# Patient Record
Sex: FEMALE | Race: WHITE | NOT HISPANIC OR LATINO | Employment: OTHER | URBAN - METROPOLITAN AREA
[De-identification: names, ages, dates, MRNs, and addresses within clinical notes are randomized per-mention and may not be internally consistent; named-entity substitution may affect disease eponyms.]

---

## 2017-01-27 ENCOUNTER — VIRTUAL VISIT (OUTPATIENT)
Dept: FAMILY MEDICINE | Facility: OTHER | Age: 57
End: 2017-01-27

## 2017-04-02 ENCOUNTER — VIRTUAL VISIT (OUTPATIENT)
Dept: FAMILY MEDICINE | Facility: OTHER | Age: 57
End: 2017-04-02

## 2017-04-02 NOTE — PROGRESS NOTES
Date:   Clinician: Alexa Mcwilliams  Clinician NPI: [id number]  Patient: Kimmy Lindsey  Patient : 1960  Patient Address: [address] [place] [address]  Patient Phone: [phone]  Visit Protocol: URI  Patient Summary:  Kimmy is a 56 year old ( : 1960 ) female who initiated a Zip for evaluation of bronchitis.      Her symptoms started suddenly 1 week ago and consist of myalgias, chest pain, malaise, loss of appetite, cough, chills, rhinitis, post-nasal drainage, nasal congestion, and hoarse voice.   She denies ear pain, fever, facial pain or pressure, itchy eyes, nausea, vomiting, dyspnea, dysphagia, and sore throat. She denies a history of facial surgery. When asked follow-up questions about her chest pain she denied feeling chest pain or pressure at rest, worsening chest pain with activity, that the chest pain is causing shortness of breath or that the chest pain is the main concern or reason for seeking medical care.   Her moderate nasal secretions are yellow. Kimmy has a moderate headache. The headache did not start before her other symptoms and is located on both sides of her head. When asked to feel her neck she denied feeling enlarged lymph nodes. She denies axillary lymphadenopathy.   Her moderately severe (cough every 5-10 minutes) productive cough is NOT more bothersome at night. She believes the cough is not caused by post-nasal drainage. Her cough produces yellow sputum.   She denies rigors.   Kimmy denies having COPD or other chronic lung disease.   Pulse: self-reported pulse rate as: 12 beats in 10 seconds.    Weight (in lbs): 140   She states she is not pregnant and denies breastfeeding. She no longer menstruates.   Kimmy does not smoke or use smokeless tobacco.   Additional information as reported by the patient (free text): I have been taking a DM cough syrup and alternating acetaminophen and ibuprofen for pain. I have had bronchitis and pneumonia a number of  times in my past. When I cough I can feel and hear the mucous rattling around in my upper chest until I get it coughed up. I don't seem to be improving at all. It started in my chest.    MEDICATIONS:  No current medications , ALLERGIES:  NKDA   Clinician Response:  Dear Kimmy,  Based on the information you have provided, I am unable to diagnose and treat you without additional information. Please be seen in a clinic or urgent care to determine the treatment that is best for you. You will not be charged for this Zip. Thanks for using [employer].   Diagnosis: Unable to diagnose  Diagnosis ICD: R69  Additional Clinician Notes: You need to be assessed in clinic where your lungs can be assessed we cannot treat you on line.

## 2017-04-06 ENCOUNTER — HOSPITAL ENCOUNTER (OUTPATIENT)
Dept: MAMMOGRAPHY | Facility: CLINIC | Age: 57
Discharge: HOME | End: 2017-04-06
Attending: FAMILY MEDICINE | Admitting: FAMILY MEDICINE
Payer: COMMERCIAL

## 2017-04-06 DIAGNOSIS — Z12.31 VISIT FOR SCREENING MAMMOGRAM: ICD-10-CM

## 2017-04-06 PROCEDURE — 77063 BREAST TOMOSYNTHESIS BI: CPT

## 2017-04-06 NOTE — PROGRESS NOTES
Kimmy,     I helping to cover some of Dr. Bruno's results since she is out of the office.     Your mammogram is normal. Plan to repeat in 1 year.    Please call or email with any additional questions or concerns  CRISTIAN Lopez CNP

## 2017-11-29 ENCOUNTER — E-VISIT (OUTPATIENT)
Dept: FAMILY MEDICINE | Facility: CLINIC | Age: 57
End: 2017-11-29

## 2017-11-29 ENCOUNTER — TELEPHONE (OUTPATIENT)
Dept: FAMILY MEDICINE | Facility: CLINIC | Age: 57
End: 2017-11-29

## 2017-11-29 DIAGNOSIS — N76.0 VAGINITIS AND VULVOVAGINITIS: Primary | ICD-10-CM

## 2017-11-29 NOTE — TELEPHONE ENCOUNTER
I called the patient and recommended she do an e visit. If the e visit is not possible she is scheduled for 3 pm tomorrow. Danna Calixto RN'

## 2017-11-29 NOTE — MR AVS SNAPSHOT
After Visit Summary   11/29/2017    Kimmy Lindsey    MRN: [id number]           Patient Information     Date Of Birth          1960        Visit Information        Provider Department      11/29/2017 11:22 AM Sangita Bruno DO [facility]        Today's Diagnoses     Vaginitis and vulvovaginitis    -  1       Follow-ups after your visit        Your next 10 appointments already scheduled     Nov 30, 2017  3:00 PM CST   SHORT with Sangita Bruno DO   [facility] ([facility])    [address] [address]   [phone]              Who to contact     Normal or non-critical lab and imaging results will be communicated to you by in[employer]Now[employer], letter or phone within 4 business days after the clinic has received the results. If you do not hear from us within 7 days, please contact the clinic through MyChart or phone. If you have a critical or abnormal lab result, we will notify you by phone as soon as possible.  Submit refill requests through [employer] or call your pharmacy and they will forward the refill request to us. Please allow 3 business days for your refill to be completed.          If you need to speak with a  for additional information , please call: [phone]           Additional Information About Your Visit        [employer] Information     [employer] gives you secure access to your electronic health record. If you see a primary care provider, you can also send messages to your care team and make appointments. If you have questions, please call your primary care clinic.  If you do not have a primary care provider, please call [phone] and they will assist you.        Care EveryWhere ID     This is your Care EveryWhere ID. This could be used by other organizations to access your [place] medical records  [id number]         Blood Pressure from Last 3 Encounters:   05/16/16 110/72   12/19/14 104/72   10/17/13  118/70    Weight from Last 3 Encounters:   05/16/16 142 lb 12.8 oz (64.8 kg)   12/19/14 138 lb (62.6 kg)   10/17/13 138 lb (62.6 kg)              Today, you had the following     No orders found for display       Primary Care Provider Office Phone # Fax #    Sangita Bruno [phone] [address] [place] [address]        Equal Access to Services     EMMA MCGHEE : Hadii aad ku hadasho Soomaali, waaxda luqadaha, qaybta kaalmada adeegyada, waxay idiin hayaan adeeg kharash la'zuleman . So [facility] [phone].    ATENCIÓN: Si habla español, tiene a bingham disposición servicios gratuitos de asistencia lingüística. Llame al [phone].    We comply with applicable federal civil rights laws and [place] laws. We do not discriminate on the basis of race, color, national origin, age, disability, sex, sexual orientation, or gender identity.            Thank you!     Thank you for choosing [facility]  for your care. Our goal is always to provide you with excellent care. Hearing back from our patients is one way we can continue to improve our services. Please take a few minutes to complete the written survey that you may receive in the mail after your visit with us. Thank you!             Your Updated Medication List - Protect others around you: Learn how to safely use, store and throw away your medicines at www.disposemymeds.org.          This list is accurate as of: 11/29/17 11:59 PM.  Always use your most recent med list.                   Brand Name Dispense Instructions for use Diagnosis    Multi-vitamin Tabs tablet   Generic drug:  multivitamin, therapeutic with minerals     90    Take one daily    Other specified prophylactic or treatment measure

## 2017-11-29 NOTE — TELEPHONE ENCOUNTER
Reason for call:  Patient reporting a symptom    Symptom or request: Yellow vaginal discharge    Duration (how long have symptoms been present): 1 week    Have you been treated for this before? No    Additional comments: Pt states after intercourse she started having discharge. Pt reports she is post menopausal and doesn't normally have this, wondering if she needs to be seen.    Phone Number patient can be reached at:  Cell number on file:    Telephone Information:   Mobile [phone]        Best Time:  any    Can we leave a detailed message on this number:  YES    Call taken on 11/29/2017 at 10:16 AM by Pratibha Egan

## 2017-11-30 ENCOUNTER — OFFICE VISIT (OUTPATIENT)
Dept: FAMILY MEDICINE | Facility: CLINIC | Age: 57
End: 2017-11-30
Payer: COMMERCIAL

## 2017-11-30 VITALS
WEIGHT: 144 LBS | BODY MASS INDEX: 23.14 KG/M2 | HEART RATE: 76 BPM | TEMPERATURE: 97.6 F | HEIGHT: 66 IN | SYSTOLIC BLOOD PRESSURE: 108 MMHG | DIASTOLIC BLOOD PRESSURE: 74 MMHG

## 2017-11-30 DIAGNOSIS — N76.0 VAGINITIS AND VULVOVAGINITIS: Primary | ICD-10-CM

## 2017-11-30 DIAGNOSIS — Z12.4 SCREENING FOR CERVICAL CANCER: ICD-10-CM

## 2017-11-30 LAB
SPECIMEN SOURCE: NORMAL
WET PREP SPEC: NORMAL

## 2017-11-30 PROCEDURE — 87210 SMEAR WET MOUNT SALINE/INK: CPT | Performed by: FAMILY MEDICINE

## 2017-11-30 PROCEDURE — G0145 SCR C/V CYTO,THINLAYER,RESCR: HCPCS | Performed by: FAMILY MEDICINE

## 2017-11-30 PROCEDURE — 87624 HPV HI-RISK TYP POOLED RSLT: CPT | Performed by: FAMILY MEDICINE

## 2017-11-30 PROCEDURE — 99213 OFFICE O/P EST LOW 20 MIN: CPT | Performed by: FAMILY MEDICINE

## 2017-11-30 NOTE — NURSING NOTE
"Initial /74  Pulse 76  Temp 97.6  F (36.4  C) (Tympanic)  Ht 5' 6\" (1.676 m)  Wt 144 lb (65.3 kg)  LMP 07/18/2011  Breastfeeding? No  BMI 23.24 kg/m2 Estimated body mass index is 23.24 kg/(m^2) as calculated from the following:    Height as of this encounter: 5' 6\" (1.676 m).    Weight as of this encounter: 144 lb (65.3 kg). .      "

## 2017-11-30 NOTE — MR AVS SNAPSHOT
After Visit Summary   11/30/2017    Kimmy Lindsey    MRN: [id number]           Patient Information     Date Of Birth          1960        Visit Information        Provider Department      11/30/2017 3:00 PM Sangita Bruno DO [facility]        Today's Diagnoses     Vaginitis and vulvovaginitis    -  1    Screening for cervical cancer          Care Instructions    The swab we did today for you today was negative for any bacterial overgrowth causing the discharge.      We'll go ahead with an ultrasound and make sure that is normal and can consider a vaginal estrogen as perhaps this is coming from some atrophy if the tissues.    You can call [phone] to schedule the ultrasound          Follow-ups after your visit        Future tests that were ordered for you today     Open Future Orders        Priority Expected Expires Ordered    US Pelvic Complete with Transvaginal Routine  11/30/2018 11/30/2017            Who to contact     Normal or non-critical lab and imaging results will be communicated to you by [employer]hart, letter or phone within 4 business days after the clinic has received the results. If you do not hear from us within 7 days, please contact the clinic through Repplerhart or phone. If you have a critical or abnormal lab result, we will notify you by phone as soon as possible.  Submit refill requests through [employer] or call your pharmacy and they will forward the refill request to us. Please allow 3 business days for your refill to be completed.          If you need to speak with a  for additional information , please call: [phone]           Additional Information About Your Visit        [employer] Information     [employer] gives you secure access to your electronic health record. If you see a primary care provider, you can also send messages to your care team and make appointments. If you have questions, please call your primary care clinic.  If you do not  "have a primary care provider, please call [phone] and they will assist you.        Care EveryWhere ID     This is your Care EveryWhere ID. This could be used by other organizations to access your [place] medical records  [id number]        Your Vitals Were     Pulse Temperature Height Last Period Breastfeeding? BMI (Body Mass Index)    76 97.6  F (36.4  C) (Tympanic) 5' 6\" (1.676 m) 07/18/2011 No 23.24 kg/m2       Blood Pressure from Last 3 Encounters:   11/30/17 108/74   05/16/16 110/72   12/19/14 104/72    Weight from Last 3 Encounters:   11/30/17 144 lb (65.3 kg)   05/16/16 142 lb 12.8 oz (64.8 kg)   12/19/14 138 lb (62.6 kg)              We Performed the Following     HPV High Risk Types DNA Cervical     Pap imaged thin layer screen with HPV - recommended age 30 - 65 years (select HPV order below)     Wet prep        Primary Care Provider Office Phone # Fax #    Sangita Phillipcliff Bruno [phone] [address] [place] [address]        Equal Access to Services     [facility]: Hadii aad jeronimo hadasho Sovickiali, waaxda luqadaha, qaybta kaalmada adeegyada, ester hollis . So [facility] [phone].    ATENCIÓN: Si habla español, tiene a bingham disposición servicios gratuitos de asistencia lingüística. Llame al [phone].    We comply with applicable federal civil rights laws and [place] laws. We do not discriminate on the basis of race, color, national origin, age, disability, sex, sexual orientation, or gender identity.            Thank you!     Thank you for choosing [facility] [place]  for your care. Our goal is always to provide you with excellent care. Hearing back from our patients is one way we can continue to improve our services. Please take a few minutes to complete the written survey that you may receive in the mail after your visit with us. Thank you!             Your Updated Medication List - Protect others around you: Learn how to safely use, " store and throw away your medicines at www.disposemymeds.org.          This list is accurate as of: 11/30/17  3:36 PM.  Always use your most recent med list.                   Brand Name Dispense Instructions for use Diagnosis    Multi-vitamin Tabs tablet   Generic drug:  multivitamin, therapeutic with minerals     90    Take one daily    Other specified prophylactic or treatment measure

## 2017-11-30 NOTE — PATIENT INSTRUCTIONS
The swab we did today for you today was negative for any bacterial overgrowth causing the discharge.      We'll go ahead with an ultrasound and make sure that is normal and can consider a vaginal estrogen as perhaps this is coming from some atrophy if the tissues.    You can call [phone] to schedule the ultrasound

## 2017-12-04 LAB
COPATH REPORT: NORMAL
PAP: NORMAL

## 2017-12-05 LAB
FINAL DIAGNOSIS: NORMAL
HPV HR 12 DNA CVX QL NAA+PROBE: NEGATIVE
HPV16 DNA SPEC QL NAA+PROBE: NEGATIVE
HPV18 DNA SPEC QL NAA+PROBE: NEGATIVE
SPECIMEN DESCRIPTION: NORMAL

## 2017-12-13 ENCOUNTER — RADIANT APPOINTMENT (OUTPATIENT)
Dept: ULTRASOUND IMAGING | Facility: CLINIC | Age: 57
End: 2017-12-13
Attending: FAMILY MEDICINE
Payer: COMMERCIAL

## 2017-12-13 DIAGNOSIS — N76.0 VAGINITIS AND VULVOVAGINITIS: ICD-10-CM

## 2017-12-13 PROCEDURE — 76856 US EXAM PELVIC COMPLETE: CPT

## 2017-12-13 PROCEDURE — 76830 TRANSVAGINAL US NON-OB: CPT

## 2017-12-20 ENCOUNTER — MYC MEDICAL ADVICE (OUTPATIENT)
Dept: FAMILY MEDICINE | Facility: CLINIC | Age: 57
End: 2017-12-20

## 2017-12-20 DIAGNOSIS — N95.2 VAGINAL ATROPHY: Primary | ICD-10-CM

## 2017-12-20 RX ORDER — ESTRADIOL 10 UG/1
10 INSERT VAGINAL
Qty: 24 TABLET | Refills: 3 | Status: SHIPPED | OUTPATIENT
Start: 2017-12-21 | End: 2018-05-22

## 2018-03-02 ENCOUNTER — MYC MEDICAL ADVICE (OUTPATIENT)
Dept: FAMILY MEDICINE | Facility: CLINIC | Age: 58
End: 2018-03-02

## 2018-03-02 DIAGNOSIS — N95.2 ATROPHIC VAGINITIS: Primary | ICD-10-CM

## 2018-04-03 ENCOUNTER — OFFICE VISIT (OUTPATIENT)
Dept: OBGYN | Facility: CLINIC | Age: 58
End: 2018-04-03
Payer: COMMERCIAL

## 2018-04-03 VITALS
BODY MASS INDEX: 22.24 KG/M2 | DIASTOLIC BLOOD PRESSURE: 67 MMHG | SYSTOLIC BLOOD PRESSURE: 99 MMHG | TEMPERATURE: 97.8 F | HEIGHT: 66 IN | RESPIRATION RATE: 16 BRPM | WEIGHT: 138.4 LBS | HEART RATE: 71 BPM

## 2018-04-03 DIAGNOSIS — N89.8 VAGINAL DISCHARGE: Primary | ICD-10-CM

## 2018-04-03 LAB
SPECIMEN SOURCE: NORMAL
WET PREP SPEC: NORMAL

## 2018-04-03 PROCEDURE — 87210 SMEAR WET MOUNT SALINE/INK: CPT | Performed by: OBSTETRICS & GYNECOLOGY

## 2018-04-03 PROCEDURE — 99213 OFFICE O/P EST LOW 20 MIN: CPT | Performed by: OBSTETRICS & GYNECOLOGY

## 2018-04-03 RX ORDER — METRONIDAZOLE 7.5 MG/G
1 GEL VAGINAL AT BEDTIME
Qty: 70 G | Refills: 0 | Status: SHIPPED | OUTPATIENT
Start: 2018-04-03 | End: 2018-04-08

## 2018-04-03 RX ORDER — CLINDAMYCIN PHOSPHATE 20 MG/G
1 CREAM VAGINAL AT BEDTIME
Qty: 40 G | Refills: 0 | Status: SHIPPED | OUTPATIENT
Start: 2018-04-03 | End: 2018-04-03

## 2018-04-03 NOTE — NURSING NOTE
"Initial BP 99/67  Pulse 71  Temp 97.8  F (36.6  C)  Resp 16  Ht 5' 6\" (1.676 m)  Wt 138 lb 6.4 oz (62.8 kg)  LMP 07/18/2011  BMI 22.34 kg/m2 Estimated body mass index is 22.34 kg/(m^2) as calculated from the following:    Height as of this encounter: 5' 6\" (1.676 m).    Weight as of this encounter: 138 lb 6.4 oz (62.8 kg). .      "

## 2018-04-03 NOTE — MR AVS SNAPSHOT
"              After Visit Summary   4/3/2018    Kimmy Lindsye    MRN: [id number]           Patient Information     Date Of Birth          1960        Visit Information        Provider Department      4/3/2018 2:00 PM Giorgio Haney MD [facility]        Today's Diagnoses     Vaginal discharge    -  1       Follow-ups after your visit        Follow-up notes from your care team     Return if symptoms worsen or fail to improve.      Who to contact     If you have questions or need follow up information about today's clinic visit or your schedule please contact [facility] directly at [phone].  Normal or non-critical lab and imaging results will be communicated to you by [employer], letter or phone within 4 business days after the clinic has received the results. If you do not hear from us within 7 days, please contact the clinic through [employer] or phone. If you have a critical or abnormal lab result, we will notify you by phone as soon as possible.  Submit refill requests through [employer] or call your pharmacy and they will forward the refill request to us. Please allow 3 business days for your refill to be completed.          Additional Information About Your Visit        MyChart Information     [employer] gives you secure access to your electronic health record. If you see a primary care provider, you can also send messages to your care team and make appointments. If you have questions, please call your primary care clinic.  If you do not have a primary care provider, please call [phone] and they will assist you.        Care EveryWhere ID     This is your Care EveryWhere ID. This could be used by other organizations to access your [place] medical records  [id number]        Your Vitals Were     Pulse Temperature Respirations Height Last Period BMI (Body Mass Index)    71 97.8  F (36.6  C) 16 5' 6\" (1.676 m) 07/18/2011 22.34 kg/m2       Blood Pressure from Last 3 " Encounters:   04/03/18 99/67   11/30/17 108/74   05/16/16 110/72    Weight from Last 3 Encounters:   04/03/18 138 lb 6.4 oz (62.8 kg)   11/30/17 144 lb (65.3 kg)   05/16/16 142 lb 12.8 oz (64.8 kg)              We Performed the Following     Wet prep          Today's Medication Changes          These changes are accurate as of 4/3/18  4:56 PM.  If you have any questions, ask your nurse or doctor.               Start taking these medicines.        Dose/Directions    metroNIDAZOLE 0.75 % vaginal gel   Commonly known as:  METROGEL   Used for:  Vaginal discharge   Started by:  Giorgio Haney MD        Dose:  1 applicator   Place 1 applicator (5 g) vaginally At Bedtime for 5 days   Quantity:  70 g   Refills:  0            Where to get your medicines      These medications were sent to [facility]/pharmacy #[id number] - [place], [place] - [address] [place] [address] AT [facility] [address], [place] [address]     Phone:  [phone]     metroNIDAZOLE 0.75 % vaginal gel                Primary Care Provider Office Phone # Fax #    Sangita Beckett Damion  [phone] [address] [place] [address]        Equal Access to Services     EMMA MCGHEE AH: Hadii andrews ku hadasho Soomaali, waaxda luqadaha, qaybta kaalmada adeegyada, waxay idiin hayzuleman bob hester. So [facility] [phone].    ATENCIÓN: Si habla español, tiene a bingham disposición servicios gratuitos de asistencia lingüística. Farrah al [phone].    We comply with applicable federal civil rights laws and [place] laws. We do not discriminate on the basis of race, color, national origin, age, disability, sex, sexual orientation, or gender identity.            Thank you!     [facility]  [facility] your [facility]. Our goal is always to provide you with excellent care. Hearing back from our patients is one way we can continue to improve our services. Please take a few minutes to complete the written survey  that you may receive in the mail after your visit with us. Thank you!             Your Updated Medication List - Protect others around you: Learn how to safely use, store and throw away your medicines at www.disposemymeds.org.          This list is accurate as of 4/3/18  4:56 PM.  Always use your most recent med list.                   Brand Name Dispense Instructions for use Diagnosis    estradiol 10 MCG Tabs vaginal tablet    VAGIFEM    24 tablet    Place 1 tablet (10 mcg) vaginally twice a week    Vaginal atrophy       metroNIDAZOLE 0.75 % vaginal gel    METROGEL    70 g    Place 1 applicator (5 g) vaginally At Bedtime for 5 days    Vaginal discharge       Multi-vitamin Tabs tablet   Generic drug:  multivitamin, therapeutic with minerals     90    Take one daily    Other specified prophylactic or treatment measure

## 2018-04-03 NOTE — PROGRESS NOTES
CC:  Consult from Sangita Bruno  for atrophic vaginitis  HPI:  Kimmy Lindsey is a 57 year old female is menopausal on no HRT until started on Vagifem 2 months ago for thick yellow DC  Her December ULTRASOUND showed no abnormality.  Patient's last menstrual period was 2011.    She was treated initially for atrophic vaginitis with some improvement within the last week  She has no odor  Patients records are available and reviewed at today's visit.    Past GYN history:  No STD history       Last PAP smear:  Normal      Past Medical History:   Diagnosis Date     PONV (postoperative nausea and vomiting)        Past Surgical History:   Procedure Laterality Date     C  DELIVERY ONLY  [id number]     x5     C CREATE NEW TUBAL OPENING      Tubal reversal     C LIGATE FALLOPIAN TUBE      Tubal ligation     COLONOSCOPY  10/13/2011    Procedure:COLONOSCOPY; Colonoscopy; Surgeon:CELIA LOUIE; Location:[facility]     DILATE CERVIX, HYSTEROSCOPY, ABLATE ENDOMETRIUM NOVASURE, COMBINED  8/10/2011    Procedure:COMBINED DILATE CERVIX, HYSTEROSCOPY, ABLATE ENDOMETRIUM NOVASURE; Hysteroscopy with Endometrial Ablation,Novasure--No D&C; Surgeon:MIRI VIZCARRA; Location:[facility]     SURGICAL HISTORY OF -   2006    left knee arthroscopy, torn meniscus       Family History   Problem Relation Age of Onset     Adopted: Yes     Unknown/Adopted Mother      Unknown/Adopted Father      Unknown/Adopted Maternal Grandmother      Unknown/Adopted Maternal Grandfather      Unknown/Adopted Paternal Grandmother      Unknown/Adopted Paternal Grandfather        Allergies: Nkda [no known drug allergies]    Current Outpatient Prescriptions   Medication Sig Dispense Refill     clindamycin (CLEOCIN) 2 % cream Place 1 applicator vaginally At Bedtime for 7 days 40 g 0     estradiol (VAGIFEM) 10 MCG TABS vaginal tablet Place 1 tablet (10 mcg) vaginally twice a week 24 tablet 3     MULTI-VITAMIN OR TABS Take one daily 90 3  "      ROS:  C: NEGATIVE for fever, chills, change in weight  I: NEGATIVE for worrisome rashes, moles or lesions  E: NEGATIVE for vision changes or irritation  E/M: NEGATIVE for ear, mouth and throat problems  R: NEGATIVE for significant cough or SOB  CV: NEGATIVE for chest pain, palpitations or peripheral edema  GI: NEGATIVE for nausea, abdominal pain, heartburn, or change in bowel habits  : NEGATIVE for frequency, dysuria, hematuria, vaginal discharge  M: NEGATIVE for significant arthralgias or myalgia  N: NEGATIVE for weakness, dizziness or paresthesias  E: NEGATIVE for temperature intolerance, skin/hair changes  P: NEGATIVE for changes in mood or affect    EXAM:  Blood pressure 99/67, pulse 71, temperature 97.8  F (36.6  C), resp. rate 16, height 5' 6\" (1.676 m), weight 138 lb 6.4 oz (62.8 kg), last menstrual period 07/18/2011, not currently breastfeeding.   BMI= Body mass index is 22.34 kg/(m^2).  General - pleasant female in no acute distress.  Pelvic - EG: normal adult female,   BUS: within normal limits,   Vagina: flattened rugae, Mod yellow discharge,    Cervix: no lesions or CMT,   Uterus: firm, normal sized and nontender,   Adnexae: no masses or tenderness.  Rectovaginal - deferred.  Musculoskeletal - no gross deformities.  Neurological - normal strength, sensation, and mental status.      ASSESSMENT/PLAN:  ((N89.8) Vaginal discharge  (primary encounter diagnosis)  Comment: using Vagifem and Vagisil  Plan: Wet prep, metroNIDAZOLE (METROGEL) 0.75 %         vaginal gel, DISCONTINUED: clindamycin         (CLEOCIN) 2 % cream due to cost                Letter will be sent to the referring provider.    Giorgio Haney    "

## 2018-04-11 ENCOUNTER — HOSPITAL ENCOUNTER (OUTPATIENT)
Dept: MAMMOGRAPHY | Facility: CLINIC | Age: 58
Discharge: HOME | End: 2018-04-11
Attending: FAMILY MEDICINE | Admitting: FAMILY MEDICINE
Payer: COMMERCIAL

## 2018-04-11 DIAGNOSIS — Z12.31 VISIT FOR SCREENING MAMMOGRAM: ICD-10-CM

## 2018-04-11 PROCEDURE — 77067 SCR MAMMO BI INCL CAD: CPT

## 2018-04-12 ENCOUNTER — MYC MEDICAL ADVICE (OUTPATIENT)
Dept: FAMILY MEDICINE | Facility: CLINIC | Age: 58
End: 2018-04-12

## 2018-05-21 ENCOUNTER — MYC MEDICAL ADVICE (OUTPATIENT)
Dept: OBGYN | Facility: CLINIC | Age: 58
End: 2018-05-21

## 2018-05-22 ENCOUNTER — OFFICE VISIT (OUTPATIENT)
Dept: FAMILY MEDICINE | Facility: CLINIC | Age: 58
End: 2018-05-22
Payer: COMMERCIAL

## 2018-05-22 VITALS
HEIGHT: 66 IN | WEIGHT: 137.8 LBS | DIASTOLIC BLOOD PRESSURE: 70 MMHG | HEART RATE: 80 BPM | BODY MASS INDEX: 22.14 KG/M2 | TEMPERATURE: 98.7 F | SYSTOLIC BLOOD PRESSURE: 106 MMHG

## 2018-05-22 DIAGNOSIS — L03.012 PARONYCHIA OF LEFT MIDDLE FINGER: Primary | ICD-10-CM

## 2018-05-22 PROCEDURE — 99213 OFFICE O/P EST LOW 20 MIN: CPT | Performed by: FAMILY MEDICINE

## 2018-05-22 RX ORDER — CEPHALEXIN 500 MG/1
500 CAPSULE ORAL 3 TIMES DAILY
Qty: 21 CAPSULE | Refills: 0 | Status: SHIPPED | OUTPATIENT
Start: 2018-05-22 | End: 2019-10-11

## 2018-05-22 NOTE — PATIENT INSTRUCTIONS
We'll try an antibiotic for a skin infection.  Okay to soak in plain warm water daily or a couple of times per day.  No hydrogen peroxide.    Keep it covered, topical ointment like bacitracin or vaseline is fine.    Let me know if it does not resolve by the end of the course or if it gets worse.

## 2018-05-22 NOTE — NURSING NOTE
"Initial /70  Pulse 80  Temp 98.7  F (37.1  C) (Tympanic)  Ht 5' 6\" (1.676 m)  Wt 137 lb 12.8 oz (62.5 kg)  LMP 07/18/2011  Breastfeeding? No  BMI 22.24 kg/m2 Estimated body mass index is 22.24 kg/(m^2) as calculated from the following:    Height as of this encounter: 5' 6\" (1.676 m).    Weight as of this encounter: 137 lb 12.8 oz (62.5 kg). .      "

## 2018-05-22 NOTE — PROGRESS NOTES
SUBJECTIVE:   Kimmy Lindsey is a 57 year old female who presents to clinic today for the following health issues:    * finger pain, redness, drainage for the last month.    Initially cut the finger higher up on the nail fold.  That has now healed but has persistent redness and tenderness at the medial base of the nail fold.  Does drain intermittently but not consistently.  Has been using hydrogen peroxide.  Keeps it covered intermittently.  No fevers/chills or systemic symptoms    Problem list and histories reviewed & adjusted, as indicated.  Additional history: as documented      Reviewed and updated as needed this visit by clinical staff  Tobacco  Allergies  Meds  Problems  Med Hx  Surg Hx  Fam Hx  Soc Hx        Reviewed and updated as needed this visit by Provider  Tobacco  Allergies  Meds  Problems  Med Hx  Surg Hx  Fam Hx  Soc Hx          ROS: Remainder of Constitutional, CV, Respiratory, GI,  negative with exception of that mentioned above    PE:  VS as above   Gen:  WN/WD/WH female in NAD   Hand:  L 3rd digit with erythema and swelling of the distal medial nail fold.  No fluctuance noted.  Mildly warm on palpation    A/p:      ICD-10-CM    1. Paronychia of left middle finger L03.012 cephALEXin (KEFLEX) 500 MG capsule     Patient Instructions   We'll try an antibiotic for a skin infection.  Okay to soak in plain warm water daily or a couple of times per day.  No hydrogen peroxide.    Keep it covered, topical ointment like bacitracin or vaseline is fine.    Let me know if it does not resolve by the end of the course or if it gets worse.        No evidence of fluid collection to drain.  Will treat with oral antibiotics and soaks.

## 2018-05-22 NOTE — MR AVS SNAPSHOT
After Visit Summary   5/22/2018    Kimmy Lindsey    MRN: [id number]           Patient Information     Date Of Birth          1960        Visit Information        Provider Department      5/22/2018 2:40 PM Sangita Bruno,  [facility]        Today's Diagnoses     Paronychia of left middle finger    -  1      Care Instructions    We'll try an antibiotic for a skin infection.  Okay to soak in plain warm water daily or a couple of times per day.  No hydrogen peroxide.    Keep it covered, topical ointment like bacitracin or vaseline is fine.    Let me know if it does not resolve by the end of the course or if it gets worse.              Follow-ups after your visit        Who to contact     Normal or non-critical lab and imaging results will be communicated to you by [employer], letter or phone within 4 business days after the clinic has received the results. If you do not hear from us within 7 days, please contact the clinic through [employer] or phone. If you have a critical or abnormal lab result, we will notify you by phone as soon as possible.  Submit refill requests through [employer] or call your pharmacy and they will forward the refill request to us. Please allow 3 business days for your refill to be completed.          If you need to speak with a  for additional information , please call: [phone]           Additional Information About Your Visit        [employer] Information     [employer] gives you secure access to your electronic health record. If you see a primary care provider, you can also send messages to your care team and make appointments. If you have questions, please call your primary care clinic.  If you do not have a primary care provider, please call [phone] and they will assist you.        Care EveryWhere ID     This is your Care EveryWhere ID. This could be used by other organizations to access your [facility] medical records  [id number]       "  Your Vitals Were     Pulse Temperature Height Last Period Breastfeeding? BMI (Body Mass Index)    80 98.7  F (37.1  C) (Tympanic) 5' 6\" (1.676 m) 07/18/2011 No 22.24 kg/m2       Blood Pressure from Last 3 Encounters:   05/22/18 106/70   04/03/18 99/67   11/30/17 108/74    Weight from Last 3 Encounters:   05/22/18 137 lb 12.8 oz (62.5 kg)   04/03/18 138 lb 6.4 oz (62.8 kg)   11/30/17 144 lb (65.3 kg)              Today, you had the following     No orders found for display         Today's Medication Changes          These changes are accurate as of 5/22/18  2:59 PM.  If you have any questions, ask your nurse or doctor.               Start taking these medicines.        Dose/Directions    cephALEXin 500 MG capsule   Commonly known as:  KEFLEX   Used for:  Paronychia of left middle finger   Started by:  Sangita Bruno DO        Dose:  500 mg   Take 1 capsule (500 mg) by mouth 3 times daily   Quantity:  21 capsule   Refills:  0            Where to get your medicines      These medications were sent to [facility]/pharmacy #[id number] - [place], [place] - [address] [facility] [address]  [address] [facility], [place] [address]     Phone:  [phone]     cephALEXin 500 MG capsule                Primary Care Provider Office Phone # Fax #    Sangita Bruno [phone] [address] [place] [address]        Equal Access to Services     [facility] AH: Hadii andrews ku hadasho Soomaali, waaxda luqadaha, qaybta kaalmada adeegyada, ester hester. So [facility] [phone].    ATENCIÓN: Si princela casey, tiene a bingham disposición servicios gratuitos de asistencia lingüística. Llame al [phone].    We comply with applicable federal civil rights laws and [place] laws. We do not discriminate on the basis of race, color, national origin, age, disability, sex, sexual orientation, or gender identity.            Thank you!     Thank you for choosing [facility] [place]  " for your care. Our goal is always to provide you with excellent care. Hearing back from our patients is one way we can continue to improve our services. Please take a few minutes to complete the written survey that you may receive in the mail after your visit with us. Thank you!             Your Updated Medication List - Protect others around you: Learn how to safely use, store and throw away your medicines at www.disposemymeds.org.          This list is accurate as of 5/22/18  2:59 PM.  Always use your most recent med list.                   Brand Name Dispense Instructions for use Diagnosis    cephALEXin 500 MG capsule    KEFLEX    21 capsule    Take 1 capsule (500 mg) by mouth 3 times daily    Paronychia of left middle finger       Multi-vitamin Tabs tablet   Generic drug:  multivitamin, therapeutic with minerals     90    Take one daily    Other specified prophylactic or treatment measure

## 2018-05-29 NOTE — TELEPHONE ENCOUNTER
Please see MyChart encounter. I recommended  evaluated by PCP.   Any recommendations or testing recommended for Tosin?     Last HPV - on 11/30/17, appeared to have normal PAP 11/30/17 and wetprep 4/3/18.    Thank you,   Norris SAGASTUME RN   [facility]

## 2018-05-29 NOTE — TELEPHONE ENCOUNTER
I agree   Nothing particular for her.   We'll get him checked out   Giorgio Haney     Patient informed in [employer] message to monitor herself as well for any sores.     Norris SAGASTUME RN   [facility]

## 2019-01-28 ENCOUNTER — VIRTUAL VISIT (OUTPATIENT)
Dept: FAMILY MEDICINE | Facility: OTHER | Age: 59
End: 2019-01-28

## 2019-01-28 NOTE — PROGRESS NOTES
"Date:   Clinician: Eagle Terrazas  Clinician NPI: [id number]  Patient: Kimmy Lindsey  Patient : 1960  Patient Address: [address] [place] [address]  Patient Phone: [phone]  Visit Protocol: URI  Patient Summary:  Kimmy is a 58 year old ( : 1960 ) female who initiated a Visit for cold, sinus infection, or influenza. When asked the question \"Please sign me up to receive news, health information and promotions. \", Kimmy responded \"No\".    Kimmy states her symptoms started suddenly 10-13 days ago. After her symptoms started, they improved and then got worse again.   Her symptoms consist of a headache, malaise, facial pain or pressure, rhinitis, tooth pain, and nasal congestion. She is experiencing difficulty breathing due to nasal congestion but she is not short of breath.   Symptom details     Nasal secretions: The color of her mucus is yellow and green.    Facial pain or pressure: The facial pain or pressure feels worse when bending over or leaning forward.     Headache: She states the headache is mild (1-3 on a 10 point pain scale).     Tooth pain: The tooth pain is not caused by a cavity, recent dental work, or other mouth problems.      Kimmy denies having myalgias, enlarged lymph nodes, sore throat, ear pain, fever, cough, chills, and wheezing. She also denies taking antibiotic medication for the symptoms, having recent facial or sinus surgery in the past 60 days, and having a sinus infection within the past year.    Weight: 135 lbs   Kimmy does not smoke or use smokeless tobacco.   MEDICATIONS: No current medications, ALLERGIES: NKDA  Clinician Response:  Dear Kimmy,  Based on the information provided, you have acute bacterial sinusitis, also known as a sinus infection. Sinus infections are caused by bacteria or a virus and symptoms are almost always identical. The difference between the 2 types of infections is timing.  Sinus infections start as viral infections " and symptoms improve on their own in about 7 days. If symptoms have not improved after 7 days or have even worsened, a bacterial infection may have developed.  Medication information  I am prescribing:     Amoxicillin 500 mg oral tablet. Take 1 tablet by mouth every 8 hours for 10 days. There are no refills with this prescription.   Antibiotics can cause an allergic reaction even if you have taken them without a problem in the past. If you develop a new rash, swelling, or difficulty breathing, stop the medication and be seen in a clinic or urgent care immediately.  A yeast infection is a side effect of taking antibiotics in some women. Please use [facility] to get treatment if you have symptoms of a yeast infection.  Self care  The following tips will keep you as comfortable as possible while you recover:     Rest    Drink plenty of water and other liquids    Take a hot shower to loosen congestion     When to seek care  Please be seen in a clinic or urgent care if any of the following occur:     Symptoms do not start to improve after 3 days of treatment    New symptoms develop, or symptoms become worse      Diagnosis: Acute bacterial sinusitis  Diagnosis ICD: J01.90  Prescription: amoxicillin 500 mg oral tablet 30 tablet, 10 days supply. Take 1 tablet by mouth every 8 hours for 10 days. Refills: 0, Refill as needed: no, Allow substitutions: yes  Pharmacy: [facility]/pharmacy #[id number] - [phone] - [address] [place] [address] [place] [address]

## 2019-10-04 ENCOUNTER — HEALTH MAINTENANCE LETTER (OUTPATIENT)
Age: 59
End: 2019-10-04

## 2019-10-11 ENCOUNTER — OFFICE VISIT (OUTPATIENT)
Dept: FAMILY MEDICINE | Facility: CLINIC | Age: 59
End: 2019-10-11
Payer: COMMERCIAL

## 2019-10-11 VITALS
RESPIRATION RATE: 12 BRPM | SYSTOLIC BLOOD PRESSURE: 112 MMHG | BODY MASS INDEX: 22.34 KG/M2 | DIASTOLIC BLOOD PRESSURE: 70 MMHG | TEMPERATURE: 97.7 F | WEIGHT: 139 LBS | HEART RATE: 84 BPM | HEIGHT: 66 IN

## 2019-10-11 DIAGNOSIS — Z13.6 CARDIOVASCULAR SCREENING; LDL GOAL LESS THAN 160: ICD-10-CM

## 2019-10-11 DIAGNOSIS — Z11.4 SCREENING FOR HIV (HUMAN IMMUNODEFICIENCY VIRUS): ICD-10-CM

## 2019-10-11 DIAGNOSIS — R22.32 MASS OF HAND, LEFT: Primary | ICD-10-CM

## 2019-10-11 DIAGNOSIS — Z11.59 ENCOUNTER FOR HEPATITIS C SCREENING TEST FOR LOW RISK PATIENT: ICD-10-CM

## 2019-10-11 PROCEDURE — 99213 OFFICE O/P EST LOW 20 MIN: CPT | Performed by: FAMILY MEDICINE

## 2019-10-11 ASSESSMENT — MIFFLIN-ST. JEOR: SCORE: 1222.25

## 2019-10-11 NOTE — PATIENT INSTRUCTIONS
I suspect the lump on your hand is a fibroma.  I would recommend a visit with hand surgery to discuss, particularly if it is growing.    Please return for fasting labs when you are able.  You will need to be fasting for 12 hours (nothing but water) prior to your blood work.

## 2019-10-11 NOTE — PROGRESS NOTES
"Subjective     Kimmy Lindsey is a 59 year old female who presents to clinic today for the following health issues:    HPI     * lump on left hand     Ongoing for the past 3 months. The nodule has slowly grown in size over this time but notes that it has been stable in size over the past 2 weeks.  Reports tenderness only when pressing firmly on the nodule. She is not taking medications for relief. No numbness or tingling fingertips or toes. States she has had good range of motion. Is not dropping items. No discolorations of the skin.     * ear fullness bilaterally  Had 8-10 hours hearing loss in the left ear on Sunday which resolved the next morning. Also reports pressure in the right ear on Sunday. States her symptoms have resolved since and she does not have any hearing loss or pain in either ear today. Denies lightheadedness, drainage or sinus tenderness.              Review of Systems   ROS COMP: Constitutional, cardiovascular, pulmonary, gi and gu systems are negative, except as otherwise noted.      Objective    /70   Pulse 84   Temp 97.7  F (36.5  C) (Tympanic)   Resp 12   Ht 1.676 m (5' 6\")   Wt 63 kg (139 lb)   LMP 07/18/2011   Breastfeeding? No   BMI 22.44 kg/m    Body mass index is 22.44 kg/m .     Physical Exam   GENERAL: healthy, alert and no distress  HENT: ear canals and TM's normal, nose and mouth without ulcers or lesions  RESP: lungs clear to auscultation - no rales, rhonchi or wheezes  CV: regular rate and rhythm, normal S1 S2, no S3 or S4, no murmur, click or rub, no peripheral edema and peripheral pulses strong  MS: Firm Right palmar nodule proximal to the 3rd MCP joint with some stringing/cording of the tendon. No erythema, swelling. Normal ROM of all fingers. Left hand normal.     Diagnostic Test Results:  Labs reviewed in Epic    Assessment & Plan       ICD-10-CM    1. Mass of hand, left R22.32 ORTHO  REFERRAL   2. CARDIOVASCULAR SCREENING; LDL GOAL LESS THAN 160 " Z13.6 Lipid panel reflex to direct LDL Fasting   3. Encounter for hepatitis C screening test for low risk patient Z11.59 **Hepatitis C Screen Reflex to RNA FUTURE anytime   4. Screening for HIV (human immunodeficiency virus) Z11.4 **HIV Antigen Antibody Combo FUTURE anytime   Mass of hand: Nodule likely not amenable to aspiration. Feels more like a fibroma. Patient does not have contracture of the hand nor is there catching of the finger. Will refer to Ortho hand for further evaluation.  Health Maintenance: Patient is not fasting today. Due for lipid panel. Will order today and patient will come in later to draw labs including Screening Hep C and HIV ordered today.        Patient Instructions   I suspect the lump on your hand is a fibroma.  I would recommend a visit with hand surgery to discuss, particularly if it is growing.    Please return for fasting labs when you are able.  You will need to be fasting for 12 hours (nothing but water) prior to your blood work.        Return in about 1 year (around 10/11/2020) for Physical Exam.    Sangita Bruno,   [facility]

## 2019-10-11 NOTE — NURSING NOTE
"Initial /70   Pulse 84   Temp 97.7  F (36.5  C) (Tympanic)   Resp 12   Ht 1.676 m (5' 6\")   Wt 63 kg (139 lb)   LMP 07/18/2011   Breastfeeding? No   BMI 22.44 kg/m   Estimated body mass index is 22.44 kg/m  as calculated from the following:    Height as of this encounter: 1.676 m (5' 6\").    Weight as of this encounter: 63 kg (139 lb). .      "

## 2019-10-16 DIAGNOSIS — Z11.59 ENCOUNTER FOR HEPATITIS C SCREENING TEST FOR LOW RISK PATIENT: ICD-10-CM

## 2019-10-16 DIAGNOSIS — Z11.4 SCREENING FOR HIV (HUMAN IMMUNODEFICIENCY VIRUS): ICD-10-CM

## 2019-10-16 DIAGNOSIS — Z13.6 CARDIOVASCULAR SCREENING; LDL GOAL LESS THAN 160: ICD-10-CM

## 2019-10-16 LAB
CHOLEST SERPL-MCNC: 217 MG/DL
HDLC SERPL-MCNC: 82 MG/DL
LDLC SERPL CALC-MCNC: 119 MG/DL
NONHDLC SERPL-MCNC: 135 MG/DL
TRIGL SERPL-MCNC: 81 MG/DL

## 2019-10-16 PROCEDURE — 86803 HEPATITIS C AB TEST: CPT | Performed by: FAMILY MEDICINE

## 2019-10-16 PROCEDURE — 87389 HIV-1 AG W/HIV-1&-2 AB AG IA: CPT | Performed by: FAMILY MEDICINE

## 2019-10-16 PROCEDURE — 36415 COLL VENOUS BLD VENIPUNCTURE: CPT | Performed by: FAMILY MEDICINE

## 2019-10-16 PROCEDURE — 80061 LIPID PANEL: CPT | Performed by: FAMILY MEDICINE

## 2019-10-17 LAB
HCV AB SERPL QL IA: NONREACTIVE
HIV 1+2 AB+HIV1 P24 AG SERPL QL IA: NONREACTIVE

## 2019-10-23 ENCOUNTER — HOSPITAL ENCOUNTER (OUTPATIENT)
Dept: MAMMOGRAPHY | Facility: CLINIC | Age: 59
Discharge: HOME | End: 2019-10-23
Attending: FAMILY MEDICINE | Admitting: FAMILY MEDICINE
Payer: COMMERCIAL

## 2019-10-23 DIAGNOSIS — Z12.31 VISIT FOR SCREENING MAMMOGRAM: ICD-10-CM

## 2019-10-23 PROCEDURE — 77063 BREAST TOMOSYNTHESIS BI: CPT

## 2019-11-12 ENCOUNTER — TRANSFERRED RECORDS (OUTPATIENT)
Dept: HEALTH INFORMATION MANAGEMENT | Facility: CLINIC | Age: 59
End: 2019-11-12

## 2020-01-24 ENCOUNTER — OFFICE VISIT (OUTPATIENT)
Dept: FAMILY MEDICINE | Facility: CLINIC | Age: 60
End: 2020-01-24
Payer: COMMERCIAL

## 2020-01-24 VITALS
HEART RATE: 80 BPM | DIASTOLIC BLOOD PRESSURE: 78 MMHG | OXYGEN SATURATION: 99 % | TEMPERATURE: 98.3 F | WEIGHT: 143 LBS | HEIGHT: 66 IN | BODY MASS INDEX: 22.98 KG/M2 | SYSTOLIC BLOOD PRESSURE: 102 MMHG

## 2020-01-24 DIAGNOSIS — J20.9 ACUTE BRONCHITIS WITH SYMPTOMS > 10 DAYS: Primary | ICD-10-CM

## 2020-01-24 PROCEDURE — 99213 OFFICE O/P EST LOW 20 MIN: CPT | Performed by: FAMILY MEDICINE

## 2020-01-24 RX ORDER — AZITHROMYCIN 250 MG/1
TABLET, FILM COATED ORAL
Qty: 6 TABLET | Refills: 0 | Status: SHIPPED | OUTPATIENT
Start: 2020-01-24 | End: 2020-01-29

## 2020-01-24 RX ORDER — CODEINE PHOSPHATE AND GUAIFENESIN 10; 100 MG/5ML; MG/5ML
1-2 SOLUTION ORAL EVERY 4 HOURS PRN
Qty: 118 ML | Refills: 0 | Status: SHIPPED | OUTPATIENT
Start: 2020-01-24 | End: 2022-01-27

## 2020-01-24 ASSESSMENT — MIFFLIN-ST. JEOR: SCORE: 1240.39

## 2020-01-24 NOTE — PATIENT INSTRUCTIONS
We'll treat for a bronchitis today.  Please begin the antibiotics right away.  You can use the cough syrup as needed but be sure not to drive after taking as it can cause drowsiness.    Please let me know if anything worsens (increased cough, shortness of breath, fever).

## 2020-01-24 NOTE — PROGRESS NOTES
"Subjective     Kimmy Lindsey is a 59 year old female who presents to clinic today for the following health issues: productive cough for last month    HPI       ENT Symptoms             Symptoms: cc Present Absent Comment   Fever/Chills   x    Fatigue  x     Muscle Aches   x    Eye Irritation  x  Watery    Sneezing  x     Nasal Israel/Drg  x  Congestion    Sinus Pressure/Pain  x  Pressure    Loss of smell  x     Dental pain  x     Sore Throat   x    Swollen Glands   x    Ear Pain/Fullness  x  Plugged    Cough x x     Wheeze   x    Chest Pain  x     Shortness of breath   x    Rash   x    Other  x  Headache      Symptom duration:  1 month   Symptom severity:  severe   Treatments tried:  mucinex   Contacts:  family     Kimmy states that she became sick 2 days before Alvin with cough.  Began to improve initially in beginning of January although cough was persistent and never resolved. Saw grandchildren last week who all had colds and now her cough sx have increased in severity again. Frequent ongoing productive cough for a week.   Sinus pressure and dental pain beginning two days ago. No fevers, N/V/D, no body aches, sore in neck/shoulders/back from coughing. No SOB, dyspnea, chest tightness.     Review of Systems   ROS COMP: Constitutional, HEENT, cardiovascular, pulmonary, gi and gu systems are negative, except as otherwise noted.      Objective    /78   Pulse 80   Temp 98.3  F (36.8  C) (Tympanic)   Ht 1.676 m (5' 6\")   Wt 64.9 kg (143 lb)   LMP 07/18/2011   SpO2 99%   Breastfeeding No   BMI 23.08 kg/m    Body mass index is 23.08 kg/m .     Physical Exam   GENERAL: healthy, alert and no distress, coughing frequently  HENT: ear canals and TM's normal, nose and mouth without ulcers or lesions  NECK: no adenopathy, no asymmetry, masses, or scars and thyroid normal to palpation  RESP: lungs clear to auscultation - no rales, rhonchi or wheezes  CV: regular rate and rhythm, normal S1 S2, no S3 or S4, no " murmur, click or rub  MS: no gross musculoskeletal defects noted.     Diagnostic Test Results:  None at visit        Assessment & Plan       ICD-10-CM    1. Acute bronchitis with symptoms > 10 days J20.9 azithromycin (ZITHROMAX) 250 MG tablet     guaiFENesin-codeine (ROBITUSSIN AC) 100-10 MG/5ML solution     Patient Instructions   We'll treat for a bronchitis today.  Please begin the antibiotics right away.  You can use the cough syrup as needed but be sure not to drive after taking as it can cause drowsiness.    Please let me know if anything worsens (increased cough, shortness of breath, fever).            Return in about 2 weeks (around 2/7/2020), or if symptoms worsen or fail to improve.       Sangita Bruno, DO  [facility]

## 2020-01-24 NOTE — NURSING NOTE
"Initial /78   Pulse 80   Temp 98.3  F (36.8  C) (Tympanic)   Ht 1.676 m (5' 6\")   Wt 64.9 kg (143 lb)   LMP 07/18/2011   SpO2 99%   Breastfeeding No   BMI 23.08 kg/m   Estimated body mass index is 23.08 kg/m  as calculated from the following:    Height as of this encounter: 1.676 m (5' 6\").    Weight as of this encounter: 64.9 kg (143 lb). .      "

## 2020-01-27 ENCOUNTER — MYC MEDICAL ADVICE (OUTPATIENT)
Dept: FAMILY MEDICINE | Facility: CLINIC | Age: 60
End: 2020-01-27

## 2020-11-08 ENCOUNTER — HEALTH MAINTENANCE LETTER (OUTPATIENT)
Age: 60
End: 2020-11-08

## 2021-01-08 ENCOUNTER — HOSPITAL ENCOUNTER (OUTPATIENT)
Dept: MAMMOGRAPHY | Facility: CLINIC | Age: 61
Discharge: HOME | End: 2021-01-08
Attending: FAMILY MEDICINE | Admitting: FAMILY MEDICINE
Payer: COMMERCIAL

## 2021-01-08 DIAGNOSIS — Z12.31 VISIT FOR SCREENING MAMMOGRAM: ICD-10-CM

## 2021-01-08 PROCEDURE — 77063 BREAST TOMOSYNTHESIS BI: CPT

## 2021-01-31 ENCOUNTER — HEALTH MAINTENANCE LETTER (OUTPATIENT)
Age: 61
End: 2021-01-31

## 2021-09-11 ENCOUNTER — HEALTH MAINTENANCE LETTER (OUTPATIENT)
Age: 61
End: 2021-09-11

## 2022-01-01 ENCOUNTER — HEALTH MAINTENANCE LETTER (OUTPATIENT)
Age: 62
End: 2022-01-01

## 2022-01-25 ASSESSMENT — ENCOUNTER SYMPTOMS
HEMATOCHEZIA: 0
SHORTNESS OF BREATH: 0
COUGH: 0
DYSURIA: 0
HEARTBURN: 0
SORE THROAT: 0
EYE PAIN: 0
NERVOUS/ANXIOUS: 0
DIZZINESS: 0
NAUSEA: 0
HEADACHES: 0
HEMATURIA: 0
WEAKNESS: 0
FREQUENCY: 0
FEVER: 0
CONSTIPATION: 0
BREAST MASS: 0
ABDOMINAL PAIN: 0
PALPITATIONS: 0
DIARRHEA: 0
PARESTHESIAS: 0
CHILLS: 0
ARTHRALGIAS: 0
MYALGIAS: 0
JOINT SWELLING: 0

## 2022-01-27 ENCOUNTER — OFFICE VISIT (OUTPATIENT)
Dept: FAMILY MEDICINE | Facility: CLINIC | Age: 62
End: 2022-01-27
Payer: COMMERCIAL

## 2022-01-27 VITALS
SYSTOLIC BLOOD PRESSURE: 112 MMHG | BODY MASS INDEX: 22.69 KG/M2 | TEMPERATURE: 97.8 F | HEART RATE: 70 BPM | HEIGHT: 66 IN | DIASTOLIC BLOOD PRESSURE: 78 MMHG | OXYGEN SATURATION: 99 % | WEIGHT: 141.2 LBS

## 2022-01-27 DIAGNOSIS — Z13.6 CARDIOVASCULAR SCREENING; LDL GOAL LESS THAN 160: ICD-10-CM

## 2022-01-27 DIAGNOSIS — Z13.1 SCREENING FOR DIABETES MELLITUS: ICD-10-CM

## 2022-01-27 DIAGNOSIS — Z13.21 ENCOUNTER FOR VITAMIN DEFICIENCY SCREENING: ICD-10-CM

## 2022-01-27 DIAGNOSIS — Z00.00 ROUTINE GENERAL MEDICAL EXAMINATION AT A HEALTH CARE FACILITY: Primary | ICD-10-CM

## 2022-01-27 DIAGNOSIS — Z12.11 SPECIAL SCREENING FOR MALIGNANT NEOPLASMS, COLON: ICD-10-CM

## 2022-01-27 PROCEDURE — 99396 PREV VISIT EST AGE 40-64: CPT | Performed by: FAMILY MEDICINE

## 2022-01-27 ASSESSMENT — ENCOUNTER SYMPTOMS
COUGH: 0
FEVER: 0
ARTHRALGIAS: 0
CONSTIPATION: 0
DYSURIA: 0
HEMATOCHEZIA: 0
NAUSEA: 0
WEAKNESS: 0
JOINT SWELLING: 0
BREAST MASS: 0
HEADACHES: 0
DIZZINESS: 0
MYALGIAS: 0
CHILLS: 0
PALPITATIONS: 0
ABDOMINAL PAIN: 0
SHORTNESS OF BREATH: 0
SORE THROAT: 0
PARESTHESIAS: 0
DIARRHEA: 0
FREQUENCY: 0
NERVOUS/ANXIOUS: 0
HEMATURIA: 0
HEARTBURN: 0
EYE PAIN: 0

## 2022-01-27 ASSESSMENT — MIFFLIN-ST. JEOR: SCORE: 1214.29

## 2022-01-27 NOTE — PATIENT INSTRUCTIONS
You will need to be fasting for 12 hours (nothing but water) prior to your blood work.  You can schedule that when ever it works for you        Preventive Health Recommendations  Female Ages 50 - 64    Yearly exam: See your health care provider every year in order to  o Review health changes.   o Discuss preventive care.    o Review your medicines if your doctor has prescribed any.      Get a Pap test every three years (unless you have an abnormal result and your provider advises testing more often).    If you get Pap tests with HPV test, you only need to test every 5 years, unless you have an abnormal result.     You do not need a Pap test if your uterus was removed (hysterectomy) and you have not had cancer.    You should be tested each year for STDs (sexually transmitted diseases) if you're at risk.     Have a mammogram every 1 to 2 years.    Have a colonoscopy at age 50, or have a yearly FIT test (stool test). These exams screen for colon cancer.      Have a cholesterol test every 5 years, or more often if advised.    Have a diabetes test (fasting glucose) every three years. If you are at risk for diabetes, you should have this test more often.     If you are at risk for osteoporosis (brittle bone disease), think about having a bone density scan (DEXA).    Shots: Get a flu shot each year. Get a tetanus shot every 10 years.    Nutrition:     Eat at least 5 servings of fruits and vegetables each day.    Eat whole-grain bread, whole-wheat pasta and brown rice instead of white grains and rice.    Get adequate Calcium and Vitamin D.     Lifestyle    Exercise at least 150 minutes a week (30 minutes a day, 5 days a week). This will help you control your weight and prevent disease.    Limit alcohol to one drink per day.    No smoking.     Wear sunscreen to prevent skin cancer.     See your dentist every six months for an exam and cleaning.    See your eye doctor every 1 to 2 years.

## 2022-01-27 NOTE — NURSING NOTE
"Initial /78   Pulse 70   Temp 97.8  F (36.6  C) (Tympanic)   Ht 1.664 m (5' 5.5\")   Wt 64 kg (141 lb 3.2 oz)   LMP 07/18/2011   SpO2 99%   Breastfeeding No   BMI 23.14 kg/m   Estimated body mass index is 23.14 kg/m  as calculated from the following:    Height as of this encounter: 1.664 m (5' 5.5\").    Weight as of this encounter: 64 kg (141 lb 3.2 oz). .      "

## 2022-01-27 NOTE — PROGRESS NOTES
SUBJECTIVE:   CC: Kimmy Lindsey is an 61 year old woman who presents for preventive health visit.       Patient has been advised of split billing requirements and indicates understanding: Yes     Healthy Habits:     Getting at least 3 servings of Calcium per day:  Yes    Bi-annual eye exam:  Yes    Dental care twice a year:  Yes    Sleep apnea or symptoms of sleep apnea:  Daytime drowsiness    Diet:  Regular (no restrictions)    Frequency of exercise:  2-3 days/week    Duration of exercise:  30-45 minutes    Taking medications regularly:  Yes    Medication side effects:  None    PHQ-2 Total Score: 0    Additional concerns today:  No      Today's PHQ-2 Score:   PHQ-2 ( 1999 [employer]) 1/25/2022   Q1: Little interest or pleasure in doing things 0   Q2: Feeling down, depressed or hopeless 0   PHQ-2 Score 0   Q1: Little interest or pleasure in doing things Not at all   Q2: Feeling down, depressed or hopeless Not at all   PHQ-2 Score 0       Abuse: Current or Past (Physical, Sexual or Emotional) - No  Do you feel safe in your environment? Yes    Have you ever done Advance Care Planning? (For example, a Health Directive, POLST, or a discussion with a medical provider or your loved ones about your wishes):     Social History     Tobacco Use     Smoking status: Never Smoker     Smokeless tobacco: Never Used   Substance Use Topics     Alcohol use: Yes     Alcohol/week: 1.7 standard drinks     Comment: a glass a wine a week         Alcohol Use 1/25/2022   Prescreen: >3 drinks/day or >7 drinks/week? No   Prescreen: >3 drinks/day or >7 drinks/week? -       Reviewed orders with patient.  Reviewed health maintenance and updated orders accordingly - Yes      Breast Cancer Screening:  Any new diagnosis of family breast, ovarian, or bowel cancer? No    FHS-7: No flowsheet data found.    Mammogram Screening: Recommended mammography every 1-2 years with patient discussion and risk factor consideration  Pertinent mammograms are  reviewed under the imaging tab.    History of abnormal Pap smear: NO - age 30-65 PAP every 5 years with negative HPV co-testing recommended  PAP / HPV Latest Ref Rng & Units 2017   PAP (Historical) - NIL NIL NIL   HPV16 NEG:Negative Negative - -   HPV18 NEG:Negative Negative - -   HRHPV NEG:Negative Negative - -     Reviewed and updated as needed this visit by clinical staff  Tobacco  Allergies  Meds   Med Hx  Surg Hx  Fam Hx  Soc Hx       Reviewed and updated as needed this visit by Provider  Tobacco  Allergies  Meds   Med Hx  Surg Hx  Fam Hx  Soc Hx      Past Medical History:   Diagnosis Date     PONV (postoperative nausea and vomiting)       Past Surgical History:   Procedure Laterality Date     COLONOSCOPY  10/13/2011    Procedure:COLONOSCOPY; Colonoscopy; Surgeon:CELIA LOUIE; Location:[facility]     DILATE CERVIX, HYSTEROSCOPY, ABLATE ENDOMETRIUM NOVASURE, COMBINED  8/10/2011    Procedure:COMBINED DILATE CERVIX, HYSTEROSCOPY, ABLATE ENDOMETRIUM NOVASURE; Hysteroscopy with Endometrial Ablation,Novasure--No D&C; Surgeon:MIRI VIZCARRA; Location:[facility]     SURGICAL HISTORY OF -   2006    left knee arthroscopy, torn meniscus     ZZC  DELIVERY ONLY  [id number]     x5     ZZC CREATE NEW TUBAL OPENING      Tubal reversal     [facility] LIGATE FALLOPIAN TUBE      Tubal ligation       Review of Systems   Constitutional: Negative for chills and fever.   HENT: Negative for congestion, ear pain, hearing loss and sore throat.    Eyes: Negative for pain and visual disturbance.   Respiratory: Negative for cough and shortness of breath.    Cardiovascular: Negative for chest pain, palpitations and peripheral edema.   Gastrointestinal: Negative for abdominal pain, constipation, diarrhea, heartburn, hematochezia and nausea.   Breasts:  Negative for tenderness, breast mass and discharge.   Genitourinary: Negative for dysuria, frequency, genital sores, hematuria,  "pelvic pain, urgency, vaginal bleeding and vaginal discharge.   Musculoskeletal: Negative for arthralgias, joint swelling and myalgias.   Skin: Negative for rash.   Neurological: Negative for dizziness, weakness, headaches and paresthesias.   Psychiatric/Behavioral: Negative for mood changes. The patient is not nervous/anxious.      Pt notes episode where she was sitting on the sofa last night watching TV.  Had been seated for a while.  She stood and walked directly up the stairs.  When she got to the top she felt like her heart was beating fast an she was lightheaded.  Laid down and lightheadedness passed but felt like her HR stayed fast.  Measured it and pulse was in the 90's.  Has had similar episodes in the past without the lightheadedness.  No CP or SOB with the episode.    Regularly bikes for exercise 3-4 times per week with no difficulty.  Has never been lightheaded with exercise , no CP, no change in exertional tolerance.    Suspect episode was related to orthostasis.  She will seek care for any change in extertional tolerance or recurrence of symptoms.     OBJECTIVE:   /78   Pulse 70   Temp 97.8  F (36.6  C) (Tympanic)   Ht 1.664 m (5' 5.5\")   Wt 64 kg (141 lb 3.2 oz)   LMP 07/18/2011   SpO2 99%   Breastfeeding No   BMI 23.14 kg/m    Physical Exam  GENERAL APPEARANCE: healthy, alert and no distress  EYES: Eyes grossly normal to inspection, PERRL and conjunctivae and sclerae normal  NECK: no adenopathy, no asymmetry, masses, or scars and thyroid normal to palpation  RESP: lungs clear to auscultation - no rales, rhonchi or wheezes  BREAST: declined by pt  CV: regular rate and rhythm, normal S1 S2, no S3 or S4, no murmur, click or rub, no peripheral edema and peripheral pulses strong  ABDOMEN: soft, nontender, no hepatosplenomegaly, no masses and bowel sounds normal  MS: no musculoskeletal defects are noted and gait is age appropriate without ataxia  NEURO: Normal strength and tone, sensory exam " "grossly normal, mentation intact and speech normal  PSYCH: mentation appears normal and affect normal/bright    Diagnostic Test Results:  Labs reviewed in Epic    ASSESSMENT/PLAN:       ICD-10-CM    1. Routine general medical examination at a health care facility  Z00.00    2. Special screening for malignant neoplasms, colon  Z12.11 COLOGUARD(EXACT SCIENCES)   3. CARDIOVASCULAR SCREENING; LDL GOAL LESS THAN 160  Z13.6 Lipid panel reflex to direct LDL Fasting   4. Screening for diabetes mellitus  Z13.1 Glucose   5. Encounter for vitamin deficiency screening  Z13.21 Vitamin D Deficiency     Pt declined Adcel, flu and covid vaccines      Patient has been advised of split billing requirements and indicates understanding: Yes    COUNSELING:  Reviewed preventive health counseling, as reflected in patient instructions    Estimated body mass index is 23.14 kg/m  as calculated from the following:    Height as of this encounter: 1.664 m (5' 5.5\").    Weight as of this encounter: 64 kg (141 lb 3.2 oz).        She reports that she has never smoked. She has never used smokeless tobacco.      Counseling Resources:  ATP IV Guidelines  Pooled Cohorts Equation Calculator  Breast Cancer Risk Calculator  BRCA-Related Cancer Risk Assessment: FHS-7 Tool  FRAX Risk Assessment  ICSI Preventive Guidelines  Dietary Guidelines for Americans, 2010  USDA's MyPlate  ASA Prophylaxis  Lung CA Screening    Sangita Bruno DO  [facility]  "

## 2022-02-04 ENCOUNTER — MYC MEDICAL ADVICE (OUTPATIENT)
Dept: FAMILY MEDICINE | Facility: CLINIC | Age: 62
End: 2022-02-04

## 2022-02-04 ENCOUNTER — LAB (OUTPATIENT)
Dept: LAB | Facility: CLINIC | Age: 62
End: 2022-02-04
Payer: COMMERCIAL

## 2022-02-04 DIAGNOSIS — Z13.21 ENCOUNTER FOR VITAMIN DEFICIENCY SCREENING: ICD-10-CM

## 2022-02-04 DIAGNOSIS — Z13.1 SCREENING FOR DIABETES MELLITUS: ICD-10-CM

## 2022-02-04 DIAGNOSIS — Z13.6 CARDIOVASCULAR SCREENING; LDL GOAL LESS THAN 160: ICD-10-CM

## 2022-02-04 LAB
CHOLEST SERPL-MCNC: 221 MG/DL
FASTING STATUS PATIENT QL REPORTED: YES
FASTING STATUS PATIENT QL REPORTED: YES
GLUCOSE BLD-MCNC: 94 MG/DL (ref 70–99)
HDLC SERPL-MCNC: 83 MG/DL
LDLC SERPL CALC-MCNC: 122 MG/DL
NONHDLC SERPL-MCNC: 138 MG/DL
TRIGL SERPL-MCNC: 79 MG/DL

## 2022-02-04 PROCEDURE — 82947 ASSAY GLUCOSE BLOOD QUANT: CPT

## 2022-02-04 PROCEDURE — 36415 COLL VENOUS BLD VENIPUNCTURE: CPT

## 2022-02-04 PROCEDURE — 82306 VITAMIN D 25 HYDROXY: CPT

## 2022-02-04 PROCEDURE — 80061 LIPID PANEL: CPT

## 2022-02-06 LAB — DEPRECATED CALCIDIOL+CALCIFEROL SERPL-MC: 144 UG/L (ref 20–75)

## 2022-02-10 ENCOUNTER — HOSPITAL ENCOUNTER (OUTPATIENT)
Dept: MAMMOGRAPHY | Facility: CLINIC | Age: 62
Discharge: HOME | End: 2022-02-10
Attending: FAMILY MEDICINE | Admitting: FAMILY MEDICINE
Payer: COMMERCIAL

## 2022-02-10 DIAGNOSIS — Z12.31 VISIT FOR SCREENING MAMMOGRAM: ICD-10-CM

## 2022-02-10 PROCEDURE — 77067 SCR MAMMO BI INCL CAD: CPT

## 2022-02-14 LAB — COLOGUARD-ABSTRACT: POSITIVE

## 2022-02-24 ENCOUNTER — TELEPHONE (OUTPATIENT)
Dept: FAMILY MEDICINE | Facility: CLINIC | Age: 62
End: 2022-02-24
Payer: COMMERCIAL

## 2022-02-24 DIAGNOSIS — R19.5 POSITIVE COLORECTAL CANCER SCREENING USING COLOGUARD TEST: Primary | ICD-10-CM

## 2022-02-24 NOTE — TELEPHONE ENCOUNTER
Call placed to patient.  Relayed message per Dr Bruno.  Scheduling number given to call if she is not contacted in 2 business days.  Tonya Peña RN

## 2022-02-24 NOTE — TELEPHONE ENCOUNTER
"Please give Tosin a call.  Her Cologuard test for colon cancer screening came back \"positive\".  This does NOT mean she has colon cancer but it does mean she needs a colonoscopy as we discussed.  Referral placed for diagnostic colonoscopy.  She should get a call to schedule    Sangita Bruno DO'  "

## 2022-04-07 ENCOUNTER — DOCUMENTATION ONLY (OUTPATIENT)
Dept: LAB | Facility: CLINIC | Age: 62
End: 2022-04-07
Payer: COMMERCIAL

## 2022-04-07 NOTE — PROGRESS NOTES
Patient has an upcoming lab appointment for a Pre-op Covid Swab collection on 4/11/22 at [facility]. Please review and place future orders that may be needed. Otherwise please call patient to cancel lab appointment.    Thank you,  [facility] lab staff

## 2022-04-11 ENCOUNTER — LAB (OUTPATIENT)
Dept: LAB | Facility: CLINIC | Age: 62
End: 2022-04-11
Payer: COMMERCIAL

## 2022-04-11 DIAGNOSIS — Z20.822 ENCOUNTER FOR LABORATORY TESTING FOR COVID-19 VIRUS: ICD-10-CM

## 2022-04-11 PROCEDURE — U0005 INFEC AGEN DETEC AMPLI PROBE: HCPCS

## 2022-04-11 PROCEDURE — U0003 INFECTIOUS AGENT DETECTION BY NUCLEIC ACID (DNA OR RNA); SEVERE ACUTE RESPIRATORY SYNDROME CORONAVIRUS 2 (SARS-COV-2) (CORONAVIRUS DISEASE [COVID-19]), AMPLIFIED PROBE TECHNIQUE, MAKING USE OF HIGH THROUGHPUT TECHNOLOGIES AS DESCRIBED BY CMS-2020-01-R: HCPCS

## 2022-04-12 ENCOUNTER — ANESTHESIA EVENT (OUTPATIENT)
Dept: GASTROENTEROLOGY | Facility: CLINIC | Age: 62
End: 2022-04-12
Payer: COMMERCIAL

## 2022-04-12 LAB — SARS-COV-2 RNA RESP QL NAA+PROBE: NEGATIVE

## 2022-04-12 NOTE — ANESTHESIA PREPROCEDURE EVALUATION
Anesthesia Pre-Procedure Evaluation    Patient: Kimmy Lindsey   MRN: [id number] : 1960        Procedure : Procedure(s):  COLONOSCOPY          Past Medical History:   Diagnosis Date     PONV (postoperative nausea and vomiting)       Past Surgical History:   Procedure Laterality Date     COLONOSCOPY  10/13/2011    Procedure:COLONOSCOPY; Colonoscopy; Surgeon:CELIA LOUIE; Location:[facility]     DILATE CERVIX, HYSTEROSCOPY, ABLATE ENDOMETRIUM NOVASURE, COMBINED  8/10/2011    Procedure:COMBINED DILATE CERVIX, HYSTEROSCOPY, ABLATE ENDOMETRIUM NOVASURE; Hysteroscopy with Endometrial Ablation,Novasure--No D&C; Surgeon:MIRI VIZCARRA; Location:[facility]     SURGICAL HISTORY OF -   2006    left knee arthroscopy, torn meniscus     ZZC  DELIVERY ONLY  [id number]     x5     ZZC CREATE NEW TUBAL OPENING      Tubal reversal     ZZC LIGATE FALLOPIAN TUBE      Tubal ligation      Allergies   Allergen Reactions     Nkda [No Known Drug Allergies]       Social History     Tobacco Use     Smoking status: Never Smoker     Smokeless tobacco: Never Used   Substance Use Topics     Alcohol use: Yes     Alcohol/week: 1.7 standard drinks     Comment: a glass a wine a week      Wt Readings from Last 1 Encounters:   22 64 kg (141 lb 3.2 oz)        Anesthesia Evaluation   Pt has had prior anesthetic.     History of anesthetic complications  - PONV.      ROS/MED HX  ENT/Pulmonary:  - neg pulmonary ROS     Neurologic:  - neg neurologic ROS     Cardiovascular:     (+) Dyslipidemia -----    METS/Exercise Tolerance:     Hematologic:  - neg hematologic  ROS     Musculoskeletal:   (+) arthritis,     GI/Hepatic: Comment: Positive colorectal cancer screening using Cologuard test     (+) bowel prep,     Renal/Genitourinary:  - neg Renal ROS     Endo:  - neg endo ROS     Psychiatric/Substance Use:  - neg psychiatric ROS     Infectious Disease:  - neg infectious disease ROS     Malignancy:  - neg malignancy ROS      Other:  - neg other ROS          Physical Exam    Airway        Mallampati: I   TM distance: > 3 FB   Neck ROM: full   Mouth opening: > 3 cm    Respiratory Devices and Support         Dental  no notable dental history         Cardiovascular   cardiovascular exam normal          Pulmonary   pulmonary exam normal                OUTSIDE LABS:  CBC:   Lab Results   Component Value Date    WBC 4.8 07/15/2009    WBC 5.4 10/04/2005    HGB 14.4 08/04/2011    HGB 13.6 06/29/2011    HCT 33.7 (L) 07/15/2009    HCT 41.0 10/04/2005     07/15/2009     10/04/2005     BMP:   Lab Results   Component Value Date    GLC 94 02/04/2022    GLC 80 06/29/2011     COAGS: No results found for: PTT, INR, FIBR  POC:   Lab Results   Component Value Date    HCG Negative 08/10/2011     HEPATIC: No results found for: ALBUMIN, PROTTOTAL, ALT, AST, GGT, ALKPHOS, BILITOTAL, BILIDIRECT, NHI  OTHER:   Lab Results   Component Value Date    TSH 1.83 07/15/2009    CRP  10/04/2005     <0.2  The result units for this test have been changed: mg/L = mg/dL x 10       Anesthesia Plan    ASA Status:  2      Anesthesia Type: General.   Induction: Propofol.   Maintenance: TIVA.        Consents    Anesthesia Plan(s) and associated risks, benefits, and realistic alternatives discussed. Questions answered and patient/representative(s) expressed understanding.     - Discussed: Risks, Benefits and Alternatives for BOTH SEDATION and the PROCEDURE were discussed     - Discussed with:  Patient         Postoperative Care    Pain management: Oral pain medications, Multi-modal analgesia, IV analgesics.   PONV prophylaxis: Ondansetron (or other 5HT-3), Dexamethasone or Solumedrol     Comments:                CRISTIAN Horton CRNA

## 2022-04-13 ENCOUNTER — ANESTHESIA (OUTPATIENT)
Dept: GASTROENTEROLOGY | Facility: CLINIC | Age: 62
End: 2022-04-13
Payer: COMMERCIAL

## 2022-04-13 ENCOUNTER — HOSPITAL ENCOUNTER (OUTPATIENT)
Facility: CLINIC | Age: 62
Discharge: HOME | End: 2022-04-13
Attending: SURGERY | Admitting: SURGERY
Payer: COMMERCIAL

## 2022-04-13 VITALS
HEART RATE: 52 BPM | DIASTOLIC BLOOD PRESSURE: 83 MMHG | RESPIRATION RATE: 16 BRPM | HEIGHT: 65 IN | OXYGEN SATURATION: 99 % | SYSTOLIC BLOOD PRESSURE: 127 MMHG | TEMPERATURE: 98 F | BODY MASS INDEX: 24.16 KG/M2 | WEIGHT: 145 LBS

## 2022-04-13 LAB — COLONOSCOPY: NORMAL

## 2022-04-13 PROCEDURE — 370N000017 HC ANESTHESIA TECHNICAL FEE, PER MIN: Performed by: SURGERY

## 2022-04-13 PROCEDURE — 250N000009 HC RX 250: Performed by: SURGERY

## 2022-04-13 PROCEDURE — 45378 DIAGNOSTIC COLONOSCOPY: CPT | Performed by: SURGERY

## 2022-04-13 PROCEDURE — 258N000003 HC RX IP 258 OP 636: Performed by: SURGERY

## 2022-04-13 PROCEDURE — 250N000011 HC RX IP 250 OP 636: Performed by: NURSE ANESTHETIST, CERTIFIED REGISTERED

## 2022-04-13 RX ORDER — NALOXONE HYDROCHLORIDE 0.4 MG/ML
0.2 INJECTION, SOLUTION INTRAMUSCULAR; INTRAVENOUS; SUBCUTANEOUS
Status: DISCONTINUED | OUTPATIENT
Start: 2022-04-13 | End: 2022-04-13 | Stop reason: HOSPADM

## 2022-04-13 RX ORDER — SODIUM CHLORIDE, SODIUM LACTATE, POTASSIUM CHLORIDE, CALCIUM CHLORIDE 600; 310; 30; 20 MG/100ML; MG/100ML; MG/100ML; MG/100ML
INJECTION, SOLUTION INTRAVENOUS CONTINUOUS
Status: DISCONTINUED | OUTPATIENT
Start: 2022-04-13 | End: 2022-04-13 | Stop reason: HOSPADM

## 2022-04-13 RX ORDER — NALOXONE HYDROCHLORIDE 0.4 MG/ML
0.4 INJECTION, SOLUTION INTRAMUSCULAR; INTRAVENOUS; SUBCUTANEOUS
Status: DISCONTINUED | OUTPATIENT
Start: 2022-04-13 | End: 2022-04-13 | Stop reason: HOSPADM

## 2022-04-13 RX ORDER — PROPOFOL 10 MG/ML
INJECTION, EMULSION INTRAVENOUS CONTINUOUS PRN
Status: DISCONTINUED | OUTPATIENT
Start: 2022-04-13 | End: 2022-04-13

## 2022-04-13 RX ORDER — FLUMAZENIL 0.1 MG/ML
0.2 INJECTION, SOLUTION INTRAVENOUS
Status: DISCONTINUED | OUTPATIENT
Start: 2022-04-13 | End: 2022-04-13 | Stop reason: HOSPADM

## 2022-04-13 RX ORDER — ONDANSETRON 2 MG/ML
4 INJECTION INTRAMUSCULAR; INTRAVENOUS
Status: DISCONTINUED | OUTPATIENT
Start: 2022-04-13 | End: 2022-04-13 | Stop reason: HOSPADM

## 2022-04-13 RX ORDER — PROPOFOL 10 MG/ML
INJECTION, EMULSION INTRAVENOUS PRN
Status: DISCONTINUED | OUTPATIENT
Start: 2022-04-13 | End: 2022-04-13

## 2022-04-13 RX ORDER — LIDOCAINE 40 MG/G
CREAM TOPICAL
Status: DISCONTINUED | OUTPATIENT
Start: 2022-04-13 | End: 2022-04-13 | Stop reason: HOSPADM

## 2022-04-13 RX ADMIN — LIDOCAINE HYDROCHLORIDE 0.1 ML: 10 INJECTION, SOLUTION EPIDURAL; INFILTRATION; INTRACAUDAL; PERINEURAL at 08:52

## 2022-04-13 RX ADMIN — PROPOFOL 150 MG: 10 INJECTION, EMULSION INTRAVENOUS at 09:37

## 2022-04-13 RX ADMIN — SODIUM CHLORIDE, POTASSIUM CHLORIDE, SODIUM LACTATE AND CALCIUM CHLORIDE: 600; 310; 30; 20 INJECTION, SOLUTION INTRAVENOUS at 08:52

## 2022-04-13 RX ADMIN — PROPOFOL 200 MCG/KG/MIN: 10 INJECTION, EMULSION INTRAVENOUS at 09:30

## 2022-04-13 NOTE — ANESTHESIA POSTPROCEDURE EVALUATION
Patient: Kimmy Lindsey    Procedure: Procedure(s):  COLONOSCOPY       Anesthesia Type:  General    Note:  Disposition: Outpatient   Postop Pain Control: Uneventful            Sign Out: Well controlled pain   PONV: No   Neuro/Psych: Uneventful            Sign Out: Acceptable/Baseline neuro status   Airway/Respiratory: Uneventful            Sign Out: Acceptable/Baseline resp. status   CV/Hemodynamics: Uneventful            Sign Out: Acceptable CV status; No obvious hypovolemia; No obvious fluid overload   Other NRE:    DID A NON-ROUTINE EVENT OCCUR? No           Last vitals:  Vitals Value Taken Time   BP     Temp     Pulse     Resp     SpO2         Electronically Signed By: CRISTIAN Branch CRNA  April 13, 2022  9:53 AM

## 2022-04-13 NOTE — H&P
"61 year old year old female here for colonoscopy for positive cologuard.  Last colonoscopy was  negative.  Patient denies blood in stool or change in stool caliber.  There is no known family history of colon cancer or polyps.    Patient Active Problem List   Diagnosis     Ganglion     CARDIOVASCULAR SCREENING; LDL GOAL LESS THAN 160     Insomnia       Past Medical History:   Diagnosis Date     PONV (postoperative nausea and vomiting)        Past Surgical History:   Procedure Laterality Date     COLONOSCOPY  10/13/2011    Procedure:COLONOSCOPY; Colonoscopy; Surgeon:CELIA LOUIE; Location:[facility]     DILATE CERVIX, HYSTEROSCOPY, ABLATE ENDOMETRIUM NOVASURE, COMBINED  8/10/2011    Procedure:COMBINED DILATE CERVIX, HYSTEROSCOPY, ABLATE ENDOMETRIUM NOVASURE; Hysteroscopy with Endometrial Ablation,Novasure--No D&C; Surgeon:MIRI VIZCARRA; Location:[facility]     SURGICAL HISTORY OF -   2006    left knee arthroscopy, torn meniscus     ZZC  DELIVERY ONLY  [id number]     x5     ZZC CREATE NEW TUBAL OPENING      Tubal reversal     ZZC LIGATE FALLOPIAN TUBE      Tubal ligation       Family History   Adopted: Yes   Problem Relation Age of Onset     Unknown/Adopted Mother      Unknown/Adopted Father      Unknown/Adopted Maternal Grandmother      Unknown/Adopted Maternal Grandfather      Unknown/Adopted Paternal Grandmother      Unknown/Adopted Paternal Grandfather        No current outpatient medications on file.       Allergies   Allergen Reactions     Nkda [No Known Drug Allergies]        Pt reports that she has never smoked. She has never used smokeless tobacco. She reports current alcohol use of about 1.7 standard drinks of alcohol per week. She reports that she does not use drugs.    Exam:  /79   Temp 98.2  F (36.8  C)   Resp 16   Ht 1.651 m (5' 5\")   Wt 65.8 kg (145 lb)   LMP 2011   SpO2 98%   BMI 24.13 kg/m      Awake, Alert OX3  Lungs - CTA bilaterally  CV - RRR, no " murmurs, distal pulses intact  Abd - soft, non-distended, non-tender, +BS  Extr - No cyanosis or edema    A/P 61 year old year old female in need of colonoscopy for screening. Risks, benefits, alternatives, and complications were discussed including the possibility of perforation, bleeding, missed lesion and the patient agreed to proceed    Vinayak Childs,  on 4/13/2022 at 8:55 AM

## 2022-04-13 NOTE — ANESTHESIA CARE TRANSFER NOTE
Patient: Kimmy Lindsey    Procedure: Procedure(s):  COLONOSCOPY       Diagnosis: Positive colorectal cancer screening using Cologuard test [R19.5]  Diagnosis Additional Information: No value filed.    Anesthesia Type:   General     Note:    Oropharynx: oropharynx clear of all foreign objects and spontaneously breathing  Level of Consciousness: awake  Oxygen Supplementation: room air    Independent Airway: airway patency satisfactory and stable  Dentition: dentition unchanged    Report to RN Given: handoff report given  Patient transferred to: Phase II    Handoff Report: Identifed the Patient, Identified the Reponsible Provider, Reviewed the pertinent medical history, Discussed the surgical course, Reviewed Intra-OP anesthesia mangement and issues during anesthesia, Set expectations for post-procedure period and Allowed opportunity for questions and acknowledgement of understanding      Vitals:  Vitals Value Taken Time   BP     Temp     Pulse     Resp     SpO2         Electronically Signed By: CRISTIAN Branch CRNA  April 13, 2022  9:53 AM

## 2022-10-29 ENCOUNTER — HEALTH MAINTENANCE LETTER (OUTPATIENT)
Age: 62
End: 2022-10-29

## 2023-04-08 ENCOUNTER — HEALTH MAINTENANCE LETTER (OUTPATIENT)
Age: 63
End: 2023-04-08

## 2024-06-09 ENCOUNTER — HEALTH MAINTENANCE LETTER (OUTPATIENT)
Age: 64
End: 2024-06-09

## (undated) RX ORDER — PROPOFOL 10 MG/ML
INJECTION, EMULSION INTRAVENOUS
Status: DISPENSED
Start: 2022-04-13

## (undated) RX ORDER — LIDOCAINE HYDROCHLORIDE 10 MG/ML
INJECTION, SOLUTION EPIDURAL; INFILTRATION; INTRACAUDAL; PERINEURAL
Status: DISPENSED
Start: 2022-04-13

## (undated) RX ORDER — GLYCOPYRROLATE 0.2 MG/ML
INJECTION, SOLUTION INTRAMUSCULAR; INTRAVENOUS
Status: DISPENSED
Start: 2022-04-13